# Patient Record
Sex: MALE | URBAN - METROPOLITAN AREA
[De-identification: names, ages, dates, MRNs, and addresses within clinical notes are randomized per-mention and may not be internally consistent; named-entity substitution may affect disease eponyms.]

---

## 2019-03-06 ENCOUNTER — APPOINTMENT (RX ONLY)
Dept: URBAN - METROPOLITAN AREA CLINIC 152 | Facility: CLINIC | Age: 39
Setting detail: DERMATOLOGY
End: 2019-03-06

## 2019-03-06 DIAGNOSIS — L21.8 OTHER SEBORRHEIC DERMATITIS: ICD-10-CM

## 2019-03-06 PROBLEM — D23.9 OTHER BENIGN NEOPLASM OF SKIN, UNSPECIFIED: Status: ACTIVE | Noted: 2019-03-06

## 2019-03-06 PROCEDURE — ? COUNSELING

## 2019-03-06 PROCEDURE — ? PRESCRIPTION

## 2019-03-06 PROCEDURE — 99214 OFFICE O/P EST MOD 30 MIN: CPT

## 2019-03-06 RX ORDER — HYDROCORTISONE VALERATE 2 MG/G
CREAM TOPICAL
Qty: 1 | Refills: 0 | COMMUNITY
Start: 2019-03-06

## 2019-03-06 RX ORDER — CICLOPIROX 10 MG/.96ML
SHAMPOO TOPICAL QOD
Qty: 1 | Refills: 5 | COMMUNITY
Start: 2019-03-06

## 2019-03-06 RX ADMIN — HYDROCORTISONE VALERATE: 2 CREAM TOPICAL at 16:14

## 2019-03-06 RX ADMIN — CICLOPIROX: 10 SHAMPOO TOPICAL at 16:14

## 2019-03-06 ASSESSMENT — LOCATION ZONE DERM: LOCATION ZONE: FACE

## 2019-03-06 ASSESSMENT — LOCATION SIMPLE DESCRIPTION DERM: LOCATION SIMPLE: LEFT EYEBROW

## 2019-03-06 ASSESSMENT — LOCATION DETAILED DESCRIPTION DERM: LOCATION DETAILED: LEFT CENTRAL EYEBROW

## 2021-04-27 ENCOUNTER — APPOINTMENT (RX ONLY)
Dept: URBAN - METROPOLITAN AREA CLINIC 152 | Facility: CLINIC | Age: 41
Setting detail: DERMATOLOGY
End: 2021-04-27

## 2021-04-27 DIAGNOSIS — D22 MELANOCYTIC NEVI: ICD-10-CM

## 2021-04-27 DIAGNOSIS — D18.0 HEMANGIOMA: ICD-10-CM

## 2021-04-27 DIAGNOSIS — L57.8 OTHER SKIN CHANGES DUE TO CHRONIC EXPOSURE TO NONIONIZING RADIATION: ICD-10-CM

## 2021-04-27 DIAGNOSIS — L82.1 OTHER SEBORRHEIC KERATOSIS: ICD-10-CM

## 2021-04-27 DIAGNOSIS — D485 NEOPLASM OF UNCERTAIN BEHAVIOR OF SKIN: ICD-10-CM

## 2021-04-27 PROBLEM — D18.01 HEMANGIOMA OF SKIN AND SUBCUTANEOUS TISSUE: Status: ACTIVE | Noted: 2021-04-27

## 2021-04-27 PROBLEM — D22.5 MELANOCYTIC NEVI OF TRUNK: Status: ACTIVE | Noted: 2021-04-27

## 2021-04-27 PROBLEM — D22.61 MELANOCYTIC NEVI OF RIGHT UPPER LIMB, INCLUDING SHOULDER: Status: ACTIVE | Noted: 2021-04-27

## 2021-04-27 PROBLEM — D22.72 MELANOCYTIC NEVI OF LEFT LOWER LIMB, INCLUDING HIP: Status: ACTIVE | Noted: 2021-04-27

## 2021-04-27 PROBLEM — D48.5 NEOPLASM OF UNCERTAIN BEHAVIOR OF SKIN: Status: ACTIVE | Noted: 2021-04-27

## 2021-04-27 PROCEDURE — ? OBSERVATION AND MEASURE

## 2021-04-27 PROCEDURE — ? PRESCRIPTION MEDICATION MANAGEMENT

## 2021-04-27 PROCEDURE — 99213 OFFICE O/P EST LOW 20 MIN: CPT

## 2021-04-27 PROCEDURE — ? COUNSELING

## 2021-04-27 ASSESSMENT — LOCATION ZONE DERM
LOCATION ZONE: AXILLAE
LOCATION ZONE: TRUNK
LOCATION ZONE: FEET
LOCATION ZONE: LEG

## 2021-04-27 ASSESSMENT — LOCATION DETAILED DESCRIPTION DERM
LOCATION DETAILED: LEFT PROXIMAL CALF
LOCATION DETAILED: LEFT DORSAL FOOT
LOCATION DETAILED: STERNUM
LOCATION DETAILED: RIGHT ANTERIOR AXILLA
LOCATION DETAILED: LEFT SUPERIOR LATERAL UPPER BACK
LOCATION DETAILED: SUPERIOR THORACIC SPINE

## 2021-04-27 ASSESSMENT — LOCATION SIMPLE DESCRIPTION DERM
LOCATION SIMPLE: LEFT FOOT
LOCATION SIMPLE: LEFT UPPER BACK
LOCATION SIMPLE: UPPER BACK
LOCATION SIMPLE: CHEST
LOCATION SIMPLE: LEFT CALF
LOCATION SIMPLE: RIGHT AXILLA

## 2021-04-27 NOTE — PROCEDURE: OBSERVATION
Detail Level: Detailed
Size Of Lesion: 4x3
Morphology Per Location (Optional): Well defined macule
Size Of Lesion: 2x1
Morphology Per Location (Optional): Dark brown macule with thickened network throughout
Size Of Lesion: 5x5mm
Morphology Per Location (Optional): Uniform pigmented macule with notch at 5oclock with uniform network
Size Of Lesion: 3x3
Morphology Per Location (Optional): Very well defined brown papule

## 2021-04-27 NOTE — PROCEDURE: PRESCRIPTION MEDICATION MANAGEMENT
Plan: Recommended SPF 30+ daily to sun exposed areas.
Render In Strict Bullet Format?: No
Detail Level: Zone

## 2021-04-27 NOTE — PROCEDURE: COUNSELING
Detail Level: Detailed
Detail Level: Generalized
Patient Specific Counseling (Will Not Stick From Patient To Patient): Discussed that appears to be follicular driven\\nTreated with Cryotherapy today , no charge\\nDiscussed biopsy if needed at a future date, but as of today lesion appears benign.\\nEncouraged patient to apply Vaseline and a bandage for up to a year to minimize scar.\\n? Trichofolliculoma

## 2022-03-07 ENCOUNTER — APPOINTMENT (RX ONLY)
Dept: URBAN - METROPOLITAN AREA CLINIC 152 | Facility: CLINIC | Age: 42
Setting detail: DERMATOLOGY
End: 2022-03-07

## 2022-03-07 DIAGNOSIS — L942 OTHER SPECIFIED DISORDERS OF SKIN: ICD-10-CM

## 2022-03-07 DIAGNOSIS — L988 OTHER SPECIFIED DISORDERS OF SKIN: ICD-10-CM

## 2022-03-07 DIAGNOSIS — L82.0 INFLAMED SEBORRHEIC KERATOSIS: ICD-10-CM

## 2022-03-07 DIAGNOSIS — L81.4 OTHER MELANIN HYPERPIGMENTATION: ICD-10-CM

## 2022-03-07 DIAGNOSIS — L57.8 OTHER SKIN CHANGES DUE TO CHRONIC EXPOSURE TO NONIONIZING RADIATION: ICD-10-CM

## 2022-03-07 PROBLEM — D29.0 BENIGN NEOPLASM OF PENIS: Status: ACTIVE | Noted: 2022-03-07

## 2022-03-07 PROCEDURE — 99213 OFFICE O/P EST LOW 20 MIN: CPT | Mod: 25

## 2022-03-07 PROCEDURE — ? LIQUID NITROGEN

## 2022-03-07 PROCEDURE — 17110 DESTRUCTION B9 LES UP TO 14: CPT

## 2022-03-07 PROCEDURE — ? TREATMENT REGIMEN

## 2022-03-07 PROCEDURE — ? COUNSELING

## 2022-03-07 ASSESSMENT — LOCATION ZONE DERM
LOCATION ZONE: GENITALIA
LOCATION ZONE: FACE
LOCATION ZONE: PENIS

## 2022-03-07 ASSESSMENT — LOCATION DETAILED DESCRIPTION DERM
LOCATION DETAILED: RIGHT CENTRAL EYEBROW
LOCATION DETAILED: RIGHT ANTERIOR SCROTUM
LOCATION DETAILED: SUPERIOR MID FOREHEAD
LOCATION DETAILED: RIGHT DORSAL SHAFT OF PENIS

## 2022-03-07 ASSESSMENT — LOCATION SIMPLE DESCRIPTION DERM
LOCATION SIMPLE: RIGHT EYEBROW
LOCATION SIMPLE: SUPERIOR FOREHEAD
LOCATION SIMPLE: SCROTUM
LOCATION SIMPLE: PENIS

## 2022-03-07 NOTE — HPI: SKIN LESIONS
How Severe Is Your Skin Lesion?: moderate
Is This A New Presentation, Or A Follow-Up?: Skin Lesion
Additional History: Pt notes he would like lesion removed if possible.

## 2022-03-07 NOTE — PROCEDURE: LIQUID NITROGEN
Add 52 Modifier (Optional): no
Spray Paint Text: The liquid nitrogen was applied to the skin utilizing a spray paint frosting technique.
Consent: The patient's consent was obtained including but not limited to risks of crusting, scabbing, blistering, scarring, darker or lighter pigmentary change, recurrence, incomplete removal and infection.
Medical Necessity Information: It is in your best interest to select a reason for this procedure from the list below. All of these items fulfill various CMS LCD requirements except the new and changing color options.
Show Topical Anesthesia Variable?: Yes
Medical Necessity Clause: This procedure was medically necessary because the lesions that were treated were: painful, enlarging
Number Of Freeze-Thaw Cycles: 2 freeze-thaw cycles
Detail Level: Detailed
Duration Of Freeze Thaw-Cycle (Seconds): 5-10
Post-Care Instructions: I reviewed with the patient in detail post-care instructions. Patient is to wear sunprotection, and avoid picking at any of the treated lesions. Pt may apply Vaseline to crusted or scabbing areas.

## 2022-05-16 ENCOUNTER — APPOINTMENT (RX ONLY)
Dept: URBAN - METROPOLITAN AREA CLINIC 152 | Facility: CLINIC | Age: 42
Setting detail: DERMATOLOGY
End: 2022-05-16

## 2022-05-16 DIAGNOSIS — D485 NEOPLASM OF UNCERTAIN BEHAVIOR OF SKIN: ICD-10-CM

## 2022-05-16 DIAGNOSIS — L82.1 OTHER SEBORRHEIC KERATOSIS: ICD-10-CM

## 2022-05-16 DIAGNOSIS — D18.0 HEMANGIOMA: ICD-10-CM

## 2022-05-16 DIAGNOSIS — L21.8 OTHER SEBORRHEIC DERMATITIS: ICD-10-CM

## 2022-05-16 DIAGNOSIS — L44.8 OTHER SPECIFIED PAPULOSQUAMOUS DISORDERS: ICD-10-CM

## 2022-05-16 DIAGNOSIS — D22 MELANOCYTIC NEVI: ICD-10-CM

## 2022-05-16 DIAGNOSIS — L57.8 OTHER SKIN CHANGES DUE TO CHRONIC EXPOSURE TO NONIONIZING RADIATION: ICD-10-CM

## 2022-05-16 PROBLEM — D48.5 NEOPLASM OF UNCERTAIN BEHAVIOR OF SKIN: Status: ACTIVE | Noted: 2022-05-16

## 2022-05-16 PROBLEM — D22.61 MELANOCYTIC NEVI OF RIGHT UPPER LIMB, INCLUDING SHOULDER: Status: ACTIVE | Noted: 2022-05-16

## 2022-05-16 PROBLEM — D18.01 HEMANGIOMA OF SKIN AND SUBCUTANEOUS TISSUE: Status: ACTIVE | Noted: 2022-05-16

## 2022-05-16 PROBLEM — D22.72 MELANOCYTIC NEVI OF LEFT LOWER LIMB, INCLUDING HIP: Status: ACTIVE | Noted: 2022-05-16

## 2022-05-16 PROBLEM — D22.5 MELANOCYTIC NEVI OF TRUNK: Status: ACTIVE | Noted: 2022-05-16

## 2022-05-16 PROCEDURE — ? COUNSELING

## 2022-05-16 PROCEDURE — ? PRESCRIPTION MEDICATION MANAGEMENT

## 2022-05-16 PROCEDURE — 11102 TANGNTL BX SKIN SINGLE LES: CPT

## 2022-05-16 PROCEDURE — ? PRESCRIPTION

## 2022-05-16 PROCEDURE — 99214 OFFICE O/P EST MOD 30 MIN: CPT | Mod: 25

## 2022-05-16 PROCEDURE — ? OBSERVATION AND MEASURE

## 2022-05-16 PROCEDURE — 11103 TANGNTL BX SKIN EA SEP/ADDL: CPT

## 2022-05-16 PROCEDURE — ? BIOPSY BY SHAVE METHOD

## 2022-05-16 RX ORDER — DESONIDE 0.5 MG/G
CREAM TOPICAL QHS
Qty: 60 | Refills: 3 | Status: ERX | COMMUNITY
Start: 2022-05-16

## 2022-05-16 RX ADMIN — DESONIDE: 0.5 CREAM TOPICAL at 00:00

## 2022-05-16 ASSESSMENT — LOCATION DETAILED DESCRIPTION DERM
LOCATION DETAILED: SUPERIOR THORACIC SPINE
LOCATION DETAILED: LEFT DORSAL FOOT
LOCATION DETAILED: RIGHT ANTERIOR AXILLA
LOCATION DETAILED: LEFT INFERIOR LATERAL NECK
LOCATION DETAILED: LEFT POPLITEAL SKIN
LOCATION DETAILED: LEFT SUPERIOR LATERAL UPPER BACK
LOCATION DETAILED: RIGHT RIB CAGE
LOCATION DETAILED: STERNUM
LOCATION DETAILED: LEFT DISTAL POSTERIOR THIGH

## 2022-05-16 ASSESSMENT — LOCATION SIMPLE DESCRIPTION DERM
LOCATION SIMPLE: LEFT FOOT
LOCATION SIMPLE: LEFT UPPER BACK
LOCATION SIMPLE: LEFT POSTERIOR THIGH
LOCATION SIMPLE: LEFT ANTERIOR NECK
LOCATION SIMPLE: CHEST
LOCATION SIMPLE: LEFT POPLITEAL SKIN
LOCATION SIMPLE: ABDOMEN
LOCATION SIMPLE: UPPER BACK
LOCATION SIMPLE: RIGHT AXILLA

## 2022-05-16 ASSESSMENT — LOCATION ZONE DERM
LOCATION ZONE: TRUNK
LOCATION ZONE: NECK
LOCATION ZONE: FEET
LOCATION ZONE: LEG
LOCATION ZONE: AXILLAE

## 2022-05-16 NOTE — PROCEDURE: BIOPSY BY SHAVE METHOD
Detail Level: Detailed
Depth Of Biopsy: dermis
Was A Bandage Applied: Yes
Size Of Lesion In Cm: 0
Biopsy Type: H and E
Biopsy Method: Dermablade
Anesthesia Type: 1% lidocaine with epinephrine
Anesthesia Volume In Cc (Will Not Render If 0): 0.5
Hemostasis: Aluminum Chloride
Wound Care: Aquaphor
Dressing: bandage
Destruction After The Procedure: No
Type Of Destruction Used: Curettage
Curettage Text: The wound bed was treated with curettage after the biopsy was performed.
Cryotherapy Text: The wound bed was treated with cryotherapy after the biopsy was performed.
Electrodesiccation Text: The wound bed was treated with electrodesiccation after the biopsy was performed.
Electrodesiccation And Curettage Text: The wound bed was treated with electrodesiccation and curettage after the biopsy was performed.
Silver Nitrate Text: The wound bed was treated with silver nitrate after the biopsy was performed.
Lab: -[id number]
Path Notes (To The Dermatopathologist): Please have Dr. Moreno, Dr. Hughes or Dr. Callahan read my cases
Consent: Verbal consent was obtained and risks were reviewed including but not limited to scarring, infection, bleeding, and incomplete removal.
Post-Care Instructions: I reviewed with the patient in detail post-care instructions. Patient is to keep the biopsy covered and moist overnight, and then apply vaseline or Aquaphor daily until healed (apprx 1-2 weeks).
Notification Instructions: Patient will be notified of biopsy results. However, patient instructed to call the office if not contacted within 2 weeks.
Billing Type: Third-Party Bill
Information: Selecting Yes will display possible errors in your note based on the variables you have selected. This validation is only offered as a suggestion for you. PLEASE NOTE THAT THE VALIDATION TEXT WILL BE REMOVED WHEN YOU FINALIZE YOUR NOTE. IF YOU WANT TO FAX A PRELIMINARY NOTE YOU WILL NEED TO TOGGLE THIS TO 'NO' IF YOU DO NOT WANT IT IN YOUR FAXED NOTE.
Path Notes (To The Dermatopathologist): Please have Dr. Moreno, Dr. Hughes or Dr. Callahan read my cases
Billing Type: Third-Party Bill

## 2022-05-16 NOTE — PROCEDURE: COUNSELING
Detail Level: Detailed
Detail Level: Generalized
Patient Specific Counseling (Will Not Stick From Patient To Patient): -rec to use low potency steroid for one week
Sunscreen Recommendations: - Rec SPF50+ daily

## 2022-05-16 NOTE — PROCEDURE: PRESCRIPTION MEDICATION MANAGEMENT
Plan: Recommended SPF 30+ daily to sun exposed areas.
Render In Strict Bullet Format?: No
Detail Level: Zone
Plan: Patient understands to apply medication once daily for 7 days
Initiate Treatment: Desonide Cream

## 2022-10-27 ENCOUNTER — APPOINTMENT (RX ONLY)
Dept: URBAN - METROPOLITAN AREA CLINIC 152 | Facility: CLINIC | Age: 42
Setting detail: DERMATOLOGY
End: 2022-10-27

## 2022-10-27 DIAGNOSIS — L90.5 SCAR CONDITIONS AND FIBROSIS OF SKIN: ICD-10-CM

## 2022-10-27 DIAGNOSIS — L85.3 XEROSIS CUTIS: ICD-10-CM

## 2022-10-27 PROCEDURE — ? COUNSELING

## 2022-10-27 PROCEDURE — 99213 OFFICE O/P EST LOW 20 MIN: CPT

## 2022-10-27 PROCEDURE — ? DIAGNOSIS COMMENT

## 2022-10-27 ASSESSMENT — LOCATION DETAILED DESCRIPTION DERM
LOCATION DETAILED: RIGHT RIB CAGE
LOCATION DETAILED: RIGHT SUPERIOR MEDIAL UPPER BACK

## 2022-10-27 ASSESSMENT — LOCATION SIMPLE DESCRIPTION DERM
LOCATION SIMPLE: RIGHT UPPER BACK
LOCATION SIMPLE: ABDOMEN

## 2022-10-27 ASSESSMENT — LOCATION ZONE DERM: LOCATION ZONE: TRUNK

## 2022-10-27 NOTE — PROCEDURE: DIAGNOSIS COMMENT
Render Risk Assessment In Note?: no
Detail Level: Simple
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

## 2022-10-27 NOTE — PROCEDURE: COUNSELING
Detail Level: Generalized
Detail Level: Detailed
Patient Specific Counseling (Will Not Stick From Patient To Patient): - Discussed no recurrence of mod DN, not surprising given negative margins\\n- Rec at least once annual FBSE since this means hes higher risk of melanoma elsewhere

## 2022-10-27 NOTE — HPI: OTHER
Condition:: F/u biopsy results
Please Describe Your Condition:: Further evaluation of biopsy site on the right rib cage. Results show compound nevus w/ moderate melanocytic dysplasia

## 2023-05-18 ENCOUNTER — APPOINTMENT (RX ONLY)
Dept: URBAN - METROPOLITAN AREA CLINIC 152 | Facility: CLINIC | Age: 43
Setting detail: DERMATOLOGY
End: 2023-05-18

## 2023-05-18 DIAGNOSIS — L57.8 OTHER SKIN CHANGES DUE TO CHRONIC EXPOSURE TO NONIONIZING RADIATION: ICD-10-CM

## 2023-05-18 DIAGNOSIS — Z87.2 PERSONAL HISTORY OF DISEASES OF THE SKIN AND SUBCUTANEOUS TISSUE: ICD-10-CM

## 2023-05-18 DIAGNOSIS — D22 MELANOCYTIC NEVI: ICD-10-CM

## 2023-05-18 PROBLEM — D22.5 MELANOCYTIC NEVI OF TRUNK: Status: ACTIVE | Noted: 2023-05-18

## 2023-05-18 PROBLEM — D23.72 OTHER BENIGN NEOPLASM OF SKIN OF LEFT LOWER LIMB, INCLUDING HIP: Status: ACTIVE | Noted: 2023-05-18

## 2023-05-18 PROBLEM — D22.72 MELANOCYTIC NEVI OF LEFT LOWER LIMB, INCLUDING HIP: Status: ACTIVE | Noted: 2023-05-18

## 2023-05-18 PROCEDURE — ? OBSERVATION AND MEASURE

## 2023-05-18 PROCEDURE — ? COUNSELING

## 2023-05-18 PROCEDURE — ? TREATMENT REGIMEN

## 2023-05-18 PROCEDURE — ? DIAGNOSIS COMMENT

## 2023-05-18 PROCEDURE — 99213 OFFICE O/P EST LOW 20 MIN: CPT

## 2023-05-18 ASSESSMENT — LOCATION SIMPLE DESCRIPTION DERM
LOCATION SIMPLE: UPPER BACK
LOCATION SIMPLE: LEFT FOOT
LOCATION SIMPLE: ABDOMEN

## 2023-05-18 ASSESSMENT — LOCATION DETAILED DESCRIPTION DERM
LOCATION DETAILED: SUPERIOR THORACIC SPINE
LOCATION DETAILED: RIGHT RIB CAGE
LOCATION DETAILED: LEFT DORSAL FOOT
LOCATION DETAILED: RIGHT LATERAL ABDOMEN

## 2023-05-18 ASSESSMENT — LOCATION ZONE DERM
LOCATION ZONE: TRUNK
LOCATION ZONE: FEET

## 2023-05-18 NOTE — PROCEDURE: DIAGNOSIS COMMENT
Detail Level: Simple
Comment: -right rib cage, Moderate DN, bx 5.2022
Render Risk Assessment In Note?: no

## 2023-05-18 NOTE — PROCEDURE: OBSERVATION
Detail Level: Detailed
Size Of Lesion: 3x3
Morphology Per Location (Optional): Very well defined brown papule
Size Of Lesion: 6 x 5 mm
Morphology Per Location (Optional): Uniformly pigmented light brown macule
Size Of Lesion: 3 x 3 mm
Morphology Per Location (Optional): Dark brown macule with thickened network throughout, with a reddish background

## 2023-05-18 NOTE — HPI: OTHER
Condition:: FBSE
Please Describe Your Condition:: Pt notes a lesion on the left jawline that previously seemed infected, but seems to be resolved now.

## 2024-10-31 ENCOUNTER — APPOINTMENT (RX ONLY)
Dept: URBAN - METROPOLITAN AREA CLINIC 152 | Facility: CLINIC | Age: 44
Setting detail: DERMATOLOGY
End: 2024-10-31

## 2024-10-31 DIAGNOSIS — L82.0 INFLAMED SEBORRHEIC KERATOSIS: ICD-10-CM

## 2024-10-31 DIAGNOSIS — D49.2 NEOPLASM OF UNSPECIFIED BEHAVIOR OF BONE, SOFT TISSUE, AND SKIN: ICD-10-CM

## 2024-10-31 DIAGNOSIS — Z87.2 PERSONAL HISTORY OF DISEASES OF THE SKIN AND SUBCUTANEOUS TISSUE: ICD-10-CM

## 2024-10-31 DIAGNOSIS — D22 MELANOCYTIC NEVI: ICD-10-CM

## 2024-10-31 DIAGNOSIS — L82.1 OTHER SEBORRHEIC KERATOSIS: ICD-10-CM

## 2024-10-31 PROBLEM — D22.5 MELANOCYTIC NEVI OF TRUNK: Status: ACTIVE | Noted: 2024-10-31

## 2024-10-31 PROCEDURE — 99213 OFFICE O/P EST LOW 20 MIN: CPT | Mod: 25

## 2024-10-31 PROCEDURE — ? OBSERVATION AND MEASURE

## 2024-10-31 PROCEDURE — 17110 DESTRUCTION B9 LES UP TO 14: CPT

## 2024-10-31 PROCEDURE — ? COUNSELING

## 2024-10-31 PROCEDURE — ? DIAGNOSIS COMMENT

## 2024-10-31 PROCEDURE — ? BIOPSY BY SHAVE METHOD

## 2024-10-31 PROCEDURE — 11102 TANGNTL BX SKIN SINGLE LES: CPT | Mod: 59

## 2024-10-31 PROCEDURE — ? LIQUID NITROGEN

## 2024-10-31 ASSESSMENT — LOCATION ZONE DERM
LOCATION ZONE: FACE
LOCATION ZONE: FEET
LOCATION ZONE: TRUNK

## 2024-10-31 ASSESSMENT — LOCATION DETAILED DESCRIPTION DERM
LOCATION DETAILED: SUPERIOR THORACIC SPINE
LOCATION DETAILED: RIGHT MID TEMPLE
LOCATION DETAILED: LEFT DORSAL FOOT
LOCATION DETAILED: RIGHT RIB CAGE
LOCATION DETAILED: LEFT INFERIOR LATERAL MIDBACK
LOCATION DETAILED: LEFT INFERIOR UPPER BACK
LOCATION DETAILED: RIGHT LATERAL ABDOMEN

## 2024-10-31 ASSESSMENT — LOCATION SIMPLE DESCRIPTION DERM
LOCATION SIMPLE: UPPER BACK
LOCATION SIMPLE: LEFT UPPER BACK
LOCATION SIMPLE: ABDOMEN
LOCATION SIMPLE: RIGHT TEMPLE
LOCATION SIMPLE: LEFT LOWER BACK
LOCATION SIMPLE: LEFT FOOT

## 2024-10-31 NOTE — PROCEDURE: OBSERVATION
Detail Level: Detailed
Size Of Lesion: 6 x 5 mm
Morphology Per Location (Optional): Uniformly pigmented light brown macule
Size Of Lesion: 3 x 3 mm
Morphology Per Location (Optional): Dark brown macule with thickened network throughout, with a reddish background

## 2024-10-31 NOTE — PROCEDURE: BIOPSY BY SHAVE METHOD
Detail Level: Detailed
Depth Of Biopsy: dermis
Was A Bandage Applied: Yes
Size Of Lesion In Cm: 0
Biopsy Type: H and E
Biopsy Method: Dermablade
Anesthesia Type: 1% lidocaine with epinephrine
Anesthesia Volume In Cc: 1
Hemostasis: Electrocautery
Wound Care: Aquaphor
Dressing: bandage
Destruction After The Procedure: No
Type Of Destruction Used: Curettage
Curettage Text: The wound bed was treated with curettage after the biopsy was performed.
Cryotherapy Text: The wound bed was treated with cryotherapy after the biopsy was performed.
Electrodesiccation Text: The wound bed was treated with electrodesiccation after the biopsy was performed.
Electrodesiccation And Curettage Text: The wound bed was treated with electrodesiccation and curettage after the biopsy was performed.
Silver Nitrate Text: The wound bed was treated with silver nitrate after the biopsy was performed.
Lab: -[id number]
Consent: Verbal consent was obtained and risks were reviewed including but not limited to scarring, infection, bleeding, and incomplete removal.
Post-Care Instructions: I reviewed with the patient in detail post-care instructions. Patient is to keep the biopsy covered and moist overnight, and then apply vaseline or Aquaphor daily until healed (apprx 1-2 weeks).
Notification Instructions: Patient will be notified of biopsy results. However, patient instructed to call the office if not contacted within 2 weeks.
Billing Type: Third-Party Bill
Information: Selecting Yes will display possible errors in your note based on the variables you have selected. This validation is only offered as a suggestion for you. PLEASE NOTE THAT THE VALIDATION TEXT WILL BE REMOVED WHEN YOU FINALIZE YOUR NOTE. IF YOU WANT TO FAX A PRELIMINARY NOTE YOU WILL NEED TO TOGGLE THIS TO 'NO' IF YOU DO NOT WANT IT IN YOUR FAXED NOTE.

## 2024-10-31 NOTE — PROCEDURE: COUNSELING
Detail Level: Detailed
Sunscreen Recommendations: - Rec SPF50+ daily, reapply every 2h
Detail Level: Zone

## 2025-04-05 NOTE — HPI: FULL BODY SKIN EXAMINATION
[facility] - Oncology Acute  Hematology/Oncology  Consult Note    Patient Name: Juanjose Chawla  MRN: [id number]  Admission Date: 4/3/2025  Hospital Length of Stay: 2 days  Attending Provider: Itzel Webb,*  Consulting Provider: Elizabeth K Lejeune, MD  Principal Problem:<principal problem not specified>    Consults  Subjective:     HPI: This is an 84-year-old male who was seen evaluated by Pulmonary.  He was admitted to the hospital on February 28, 2025 with progressive shortness of breath he eventually underwent a diagnostic thoracentesis  He underwent CT scan chest abdomen pelvis.  He eventually underwent a diagnostic thoracentesis with social consistent with adenocarcinoma questionable primary.  He underwent a PET scan it was referred to us for further evaluation. endoscopic bronchoscopy and ultrasound and biopsy after his PET scan    PET scan showed a pneumo bone metastases, malignant hilar and mediastinal adenopathy pleural metastasis in his malignant pleural effusion.  No other evidence of a primary.  He underwent a 2nd biopsy of the station 4R lymph node.  Which again showed a poorly differentiated adenocarcinoma questionable GI primary    We are awaiting his outpatient final pathology.  However patient came back in with increasing shortness of breath to the emergency room.  He underwent a repeat CT scan last night    04/04/2025: CTA of the chest: Multiple enlarged mediastinal and bilateral hilar nodes without significant interval change.  Increasing size of the right pleural effusion with further right lower lobe zone atelectasis.  Right lower lobe consolidation more evident.      Patient was family in room this morning.  He was sitting up eating breakfast.  He was less short of breath but still gets short of breath on minimal exertion.    He was dry mouth.  He was had constipation for 3 days.  No bleeding per rectum currently.  Still with a little bit of dysuria no frequency.  
  He was on antibiotics.    We discussed the adenocarcinoma most likely lung an unknown primary.    We discussed genetic chemotherapy with carboplatin and either Taxol or Alimta.    Chemo side effects  The side effects of chemotherapy were discussed.  Including but not limited to: Nausea, vomiting, alopecia, neuropathy, neutropenia, blood transfusion, nephropathy, secondary malignancies, congestive heart failure, allergic reactions to name a few.  and patient with the opportunity to have questions answered.      Interval History 4/5/25 - Patient seen and examined this morning. Feels better since paracentesis yesterday. Notes generalized soreness and back pain which is chronic but is making rest difficult. Discussed add PO pain medications. Labs reviewed. Spoke with GI about non urgent EGD which can be done outpatient.       Oncology Treatment Plan:   OP NSCLC PACLITAXEL CARBOPLATIN Q3W    Oncology History Overview Note   Images     February 27, 2025:  CT of the chest, COPD and emphysema, right lower lobe atelectasis, lymphadenopathy in the right hilum as well as the mediastinum and smaller nodes in the left hilum.  The right hilar lymph node measures 2.5 cm other small nodes in the right hilum.  And there is a precarinal lymph node 2.2 x 2.2 cm.  And adenopathy seen in the subcarinal space as well as the mediastinum.     03/13/2025: CT abdomen pelvis, right-sided pleural effusion interstitial fibrosis lungs bilaterally prostatic hypertrophy     03/20/2025: PET-CT: Hypermetabolic consolidation right lung base with mediastinal and bilateral adenopathy and innumerable scattered hypermetabolic bone lesions     pathology  The right pleural effusion showed malignant cells consistent with a poorly differential adenocarcinoma:  Negative for TTF 1, WT1, GATA3.  Suggest an origin of the GI tract clinical pathology is recommended    2nd biopsy  1. EBUS LYMPH NODE 4R (TWO CONVENTIONAL SMEARS, ONE THIN PREP SMEAR, ONE CELL 
  BLOCK):    RARE MALIGNANT CELLS CONSISTENT WITH POORLY DIFFERENTIATED ADENOCARCINOMA.      2. EBUS LYMPH NODE 7R:    POORLY DIFFERENTIATED ADENOCARCINOMA.   The immunohistochemical profile of the tumor cells is not typical of lung   primary and may be seen with adenocarcinomas of upper   gastrointestinal/pancreaticobiliary primary.       Tempus Liquid biopsy  TP53  No actionable mutation  KEAP1 +     Metastasis to bone   3/24/2025 Initial Diagnosis    Metastasis to bone     4/5/2025 -  Chemotherapy    Treatment Summary   Plan Name: OP NSCLC PACLITAXEL CARBOPLATIN Q3W  Treatment Goal: Palliative  Status: Active  Start Date: 4/5/2025  End Date: 6/7/2025 (Planned)  Provider: Niko Johnson MD  Chemotherapy: CARBOplatin (PARAPLATIN) 635 mg in 0.9% NaCl 313.5 mL chemo infusion, 635 mg (88.4 % of original dose 715.8 mg), Intravenous, Clinic/HOD 1 time, 1 of 4 cycles  Dose modification:   (original dose 715.8 mg, Cycle 1)  PACLitaxeL (TAXOL) 135 mg/m2 = 282 mg in 0.9% NaCl 500 mL chemo infusion, 135 mg/m2 = 282 mg (100 % of original dose 135 mg/m2), Intravenous, Clinic/HOD 1 time, 1 of 4 cycles  Dose modification: 135 mg/m2 (original dose 135 mg/m2, Cycle 1, Reason: MD Discretion)     Secondary malignancy of mediastinal lymph nodes   3/24/2025 Initial Diagnosis    Secondary malignancy of mediastinal lymph nodes     4/5/2025 -  Chemotherapy    Treatment Summary   Plan Name: OP NSCLC PACLITAXEL CARBOPLATIN Q3W  Treatment Goal: Palliative  Status: Active  Start Date: 4/5/2025  End Date: 6/7/2025 (Planned)  Provider: Niko Johnson MD  Chemotherapy: CARBOplatin (PARAPLATIN) 635 mg in 0.9% NaCl 313.5 mL chemo infusion, 635 mg (88.4 % of original dose 715.8 mg), Intravenous, Clinic/HOD 1 time, 1 of 4 cycles  Dose modification:   (original dose 715.8 mg, Cycle 1)  PACLitaxeL (TAXOL) 135 mg/m2 = 282 mg in 0.9% NaCl 500 mL chemo infusion, 135 mg/m2 = 282 mg (100 % of original dose 135 mg/m2), Intravenous, Clinic/HOD 1 time, 
1 of 4 cycles  Dose modification: 135 mg/m2 (original dose 135 mg/m2, Cycle 1, Reason: MD Discretion)     Adenocarcinoma of unknown primary   4/4/2025 Cancer Staged    Staging form: Lung, AJCC V9  - Clinical stage from 4/4/2025: Stage ANTONIA (cTX, cNX, cM1b)     4/5/2025 -  Chemotherapy    Treatment Summary   Plan Name: OP NSCLC PACLITAXEL CARBOPLATIN Q3W  Treatment Goal: Palliative  Status: Active  Start Date: 4/5/2025  End Date: 6/7/2025 (Planned)  Provider: Niko Johnson MD  Chemotherapy: CARBOplatin (PARAPLATIN) 635 mg in 0.9% NaCl 313.5 mL chemo infusion, 635 mg (88.4 % of original dose 715.8 mg), Intravenous, Clinic/HOD 1 time, 1 of 4 cycles  Dose modification:   (original dose 715.8 mg, Cycle 1)  PACLitaxeL (TAXOL) 135 mg/m2 = 282 mg in 0.9% NaCl 500 mL chemo infusion, 135 mg/m2 = 282 mg (100 % of original dose 135 mg/m2), Intravenous, Clinic/HOD 1 time, 1 of 4 cycles  Dose modification: 135 mg/m2 (original dose 135 mg/m2, Cycle 1, Reason: MD Discretion)          Medications:  Continuous Infusions:      Scheduled Meds:   0.9% NaCl 250 mL flush bag   Intravenous 1 time in Clinic/HOD    amLODIPine  5 mg Oral Daily    aprepitant  130 mg Intravenous 1 time in Clinic/HOD    aspirin  81 mg Oral Daily    busPIRone  5 mg Oral BID    CARBOplatin (PARAPLATIN) 635 mg in 0.9% NaCl 313.5 mL chemo infusion  635 mg Intravenous 1 time in Clinic/HOD    [START ON 4/6/2025] dexAMETHasone  8 mg Oral Daily    diphenhydramine (BENADRYL) IV  25 mg Intravenous 1 time in Clinic/HOD    enoxparin  40 mg Subcutaneous Daily    famotidine (PF)  20 mg Intravenous 1 time in Clinic/HOD    levalbuterol  0.63 mg Nebulization Q6H    mupirocin   Nasal BID    ondansetron (ZOFRAN) 8 mg, dexAMETHasone 20 mg in 0.9% NaCl 50 mL IVPB  8 mg Intravenous 1 time in Clinic/HOD    PACLitaxeL (TAXOL) 135 mg/m2 = 282 mg in 0.9% NaCl 500 mL chemo infusion  135 mg/m2 (Treatment Plan Recorded) Intravenous 1 time in Clinic/HOD    piperacillin-tazobactam (Zosyn) 
IV (PEDS and ADULTS) (extended infusion is not appropriate)  4.5 g Intravenous Q8H    potassium chloride  40 mEq Oral Once    tamsulosin  0.4 mg Oral Daily    vancomycin 1,250 mg in D5W 250 mL IVPB (admixture device)  1,250 mg Intravenous Q18H     PRN Meds:  Current Facility-Administered Medications:     acetaminophen, 650 mg, Oral, Q6H PRN    alteplase, 2 mg, Intra-Catheter, PRN    aluminum-magnesium hydroxide-simethicone, 30 mL, Oral, QID PRN    bisacodyL, 10 mg, Rectal, Daily PRN    dextromethorphan-guaiFENesin  mg/5 ml, 5 mL, Oral, Q6H PRN    dextrose 50%, 12.5 g, Intravenous, PRN    dextrose 50%, 25 g, Intravenous, PRN    diphenhydrAMINE, 50 mg, Intravenous, Once PRN    EPINEPHrine, 0.3 mg, Intramuscular, Once PRN    glucagon (human recombinant), 1 mg, Intramuscular, PRN    glucose, 16 g, Oral, PRN    glucose, 24 g, Oral, PRN    heparin, porcine (PF), 500 Units, Intravenous, PRN    hydrocortisone sodium succinate, 100 mg, Intravenous, Once PRN    insulin aspart U-100, 0-10 Units, Subcutaneous, QID (AC + HS) PRN    lactulose 10 gram/15 ml, 30 g, Oral, Q6H PRN    melatonin, 6 mg, Oral, Nightly PRN    naloxone, 0.02 mg, Intravenous, PRN    ondansetron, 4 mg, Intravenous, Q4H PRN    oxyCODONE, 5 mg, Oral, Q4H PRN    prochlorperazine, 5 mg, Intravenous, Q6H PRN    senna-docusate, 1 tablet, Oral, BID PRN    sodium chloride 0.9%, 10 mL, Intravenous, PRN    Flushing PICC/Midline Protocol, , , Until Discontinued **AND** sodium chloride 0.9%, 10 mL, Intravenous, Q12H PRN    sodium chloride 0.9%, 10 mL, Intravenous, PRN    vancomycin - pharmacy to dose, , Intravenous, pharmacy to manage frequency     Review of patient's allergies indicates:  No Known Allergies     Past Medical History:   Diagnosis Date    Anxiety     Diabetes mellitus     HLD (hyperlipidemia)     Hypertension     Major depressive disorder, single episode, unspecified     Pneumonia, unspecified organism      Past Surgical History:   Procedure 
Laterality Date    Cardiac stent      ENDOBRONCHIAL ULTRASOUND N/A 3/28/2025    Procedure: ENDOBRONCHIAL ULTRASOUND (EBUS);  Surgeon: Jerrod Harman Jr., MD, [facility];  Location: [facility];  Service: Pulmonary;  Laterality: N/A;    HERNIA REPAIR N/A      Family History    None       Tobacco Use    Smoking status: Never    Smokeless tobacco: Never   Substance and Sexual Activity    Alcohol use: Not Currently    Drug use: Never    Sexual activity: Not on file       Review of Systems see above in HPI  Objective:     Vital Signs (Most Recent):  Temp: 98.8 °F (37.1 °C) (04/05/25 0800)  Pulse: 67 (04/05/25 0805)  Resp: 19 (04/05/25 0805)  BP: (!) 144/78 (04/05/25 0800)  SpO2: (!) 91 % (04/05/25 0805) Vital Signs (24h Range):  Temp:  [97.6 °F (36.4 °C)-99 °F (37.2 °C)] 98.8 °F (37.1 °C)  Pulse:  [] 67  Resp:  [18-20] 19  SpO2:  [89 %-98 %] 91 %  BP: (110-155)/(68-83) 144/78     Weight: 88 kg (194 lb 0.1 oz)  Body mass index is 27.84 kg/m².  Body surface area is 2.08 meters squared.      Intake/Output Summary (Last 24 hours) at 4/5/2025 0921  Last data filed at 4/5/2025 0646  Gross per 24 hour   Intake 1940.05 ml   Output 800 ml   Net 1140.05 ml       Physical Exam  Vitals reviewed.   Constitutional:       General: He is awake.      Comments: Sitting up in the chair,   Tachypneic with speaking   Anxious   HENT:      Head: Normocephalic and atraumatic.      Mouth/Throat:      Mouth: Mucous membranes are moist.      Pharynx: Oropharynx is clear.   Eyes:      Extraocular Movements: Extraocular movements intact.      Conjunctiva/sclera: Conjunctivae normal.      Pupils: Pupils are equal, round, and reactive to light.   Cardiovascular:      Rate and Rhythm: Normal rate and regular rhythm.      Pulses: Normal pulses.      Heart sounds: Normal heart sounds.   Pulmonary:      Effort: Pulmonary effort is normal.      Breath sounds: Decreased air movement present. Examination of the right-middle field reveals decreased 
breath sounds. Examination of the right-lower field reveals decreased breath sounds. Decreased breath sounds present.   Abdominal:      General: Abdomen is flat. Bowel sounds are normal. There is no distension.      Palpations: Abdomen is soft.   Musculoskeletal:         General: Normal range of motion.      Cervical back: Normal range of motion and neck supple.   Skin:     General: Skin is warm and dry.   Neurological:      General: No focal deficit present.      Mental Status: He is alert and oriented to person, place, and time. Mental status is at baseline.      GCS: GCS eye subscore is 4. GCS verbal subscore is 5. GCS motor subscore is 6.   Psychiatric:         Attention and Perception: Attention normal.         Mood and Affect: Mood is anxious.         Behavior: Behavior normal. Behavior is cooperative.         Thought Content: Thought content normal.         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    Continue DuoNebs    Hypercalcemia (corrected 
calcium 12---with his albumin of 2.8)  - IV fluids   - Added Zometa  - Check magnesium, phosphorus, vitamin-D   Weight loss   Fatigue  Anemia             Nutritional suppor    PICC line plan, we will start chemotherapy on 04/05/2025:  Carboplatin AUC of 6, Taxol 135 mg per m2   Okay for growth factors on day 2.        PLAN  - EGD non urgent as we feel strongly this is lung primary and do not want to delay chemotherapy further.   - Toxicity reviewed, will proceed with C1D1 of Carbo/Taxol with dose reduction as above  - Add Oxycodon IR 5mg Q4H for cancer related pain    Thank you for your consult.     Elizabeth K Lejeune, MD  Hematology/Oncology  [facility] - Oncology [facility]         
What Type Of Note Output Would You Prefer (Optional)?: Bullet Format
What Is The Reason For Today's Visit?: Full Body Skin Examination
What Is The Reason For Today's Visit? (Being Monitored For X): the development of new lesions
How Severe Are Your Spot(S)?: mild
Additional History: Patient notes a reoccurring lesion on the R Prearicular area that has been consistently returning for years. Pt states the lesion is pin pointed and white. Lesion has become inflamed before, fallen off but always returns.

## 2025-05-06 ENCOUNTER — APPOINTMENT (OUTPATIENT)
Dept: URBAN - METROPOLITAN AREA CLINIC 152 | Facility: CLINIC | Age: 45
Setting detail: DERMATOLOGY
End: 2025-05-06

## 2025-05-06 DIAGNOSIS — Z87.2 PERSONAL HISTORY OF DISEASES OF THE SKIN AND SUBCUTANEOUS TISSUE: ICD-10-CM

## 2025-05-06 DIAGNOSIS — D22 MELANOCYTIC NEVI: ICD-10-CM

## 2025-05-06 PROBLEM — D23.4 OTHER BENIGN NEOPLASM OF SKIN OF SCALP AND NECK: Status: ACTIVE | Noted: 2025-05-06

## 2025-05-06 PROBLEM — D22.5 MELANOCYTIC NEVI OF TRUNK: Status: ACTIVE | Noted: 2025-05-06

## 2025-05-06 PROCEDURE — ? DIAGNOSIS COMMENT

## 2025-05-06 PROCEDURE — 99213 OFFICE O/P EST LOW 20 MIN: CPT

## 2025-05-06 PROCEDURE — ? COUNSELING

## 2025-05-06 ASSESSMENT — LOCATION SIMPLE DESCRIPTION DERM
LOCATION SIMPLE: LEFT FOOT
LOCATION SIMPLE: RIGHT UPPER BACK
LOCATION SIMPLE: ABDOMEN

## 2025-05-06 ASSESSMENT — LOCATION DETAILED DESCRIPTION DERM
LOCATION DETAILED: RIGHT SUPERIOR MEDIAL UPPER BACK
LOCATION DETAILED: RIGHT RIB CAGE
LOCATION DETAILED: LEFT DORSAL FOOT

## 2025-05-06 ASSESSMENT — LOCATION ZONE DERM
LOCATION ZONE: FEET
LOCATION ZONE: TRUNK

## 2025-05-06 NOTE — PROCEDURE: DIAGNOSIS COMMENT
Detail Level: Simple
Comment: -right rib cage, Moderate DN, bx 5.2022\\n- left dorsal foot 10/2024
Render Risk Assessment In Note?: no